# Patient Record
Sex: MALE | Race: OTHER | URBAN - METROPOLITAN AREA
[De-identification: names, ages, dates, MRNs, and addresses within clinical notes are randomized per-mention and may not be internally consistent; named-entity substitution may affect disease eponyms.]

---

## 2020-12-14 ENCOUNTER — APPOINTMENT (OUTPATIENT)
Dept: GENERAL RADIOLOGY | Age: 25
End: 2020-12-14

## 2020-12-14 ENCOUNTER — HOSPITAL ENCOUNTER (EMERGENCY)
Age: 25
Discharge: HOME | End: 2020-12-14
Attending: EMERGENCY MEDICINE

## 2020-12-14 VITALS
HEART RATE: 80 BPM | HEIGHT: 68 IN | TEMPERATURE: 98 F | SYSTOLIC BLOOD PRESSURE: 118 MMHG | RESPIRATION RATE: 16 BRPM | DIASTOLIC BLOOD PRESSURE: 74 MMHG | WEIGHT: 140 LBS | BODY MASS INDEX: 21.22 KG/M2 | OXYGEN SATURATION: 99 %

## 2020-12-14 LAB
ACETAMINOPHEN LEVEL: <5 UG/ML (ref 15–30)
ALBUMIN SERPL-MCNC: 4.6 GM/DL (ref 3.4–5)
ALCOHOL SCREEN SERUM: 0.08 %WT/VOL
ALCOHOL SCREEN SERUM: 0.2 %WT/VOL
ALP BLD-CCNC: 91 IU/L (ref 40–129)
ALT SERPL-CCNC: 21 U/L (ref 10–40)
AMPHETAMINES: NEGATIVE
ANION GAP SERPL CALCULATED.3IONS-SCNC: 13 MMOL/L (ref 4–16)
AST SERPL-CCNC: 20 IU/L (ref 15–37)
BARBITURATE SCREEN URINE: NEGATIVE
BASOPHILS ABSOLUTE: 0.1 K/CU MM
BASOPHILS RELATIVE PERCENT: 0.5 % (ref 0–1)
BENZODIAZEPINE SCREEN, URINE: NEGATIVE
BILIRUB SERPL-MCNC: 0.5 MG/DL (ref 0–1)
BUN BLDV-MCNC: 8 MG/DL (ref 6–23)
CALCIUM SERPL-MCNC: 9.1 MG/DL (ref 8.3–10.6)
CANNABINOID SCREEN URINE: NEGATIVE
CHLORIDE BLD-SCNC: 104 MMOL/L (ref 99–110)
CO2: 22 MMOL/L (ref 21–32)
COCAINE METABOLITE: NEGATIVE
CREAT SERPL-MCNC: 0.7 MG/DL (ref 0.9–1.3)
DIFFERENTIAL TYPE: ABNORMAL
DOSE AMOUNT: ABNORMAL
DOSE AMOUNT: ABNORMAL
DOSE TIME: ABNORMAL
DOSE TIME: ABNORMAL
EOSINOPHILS ABSOLUTE: 0.1 K/CU MM
EOSINOPHILS RELATIVE PERCENT: 0.8 % (ref 0–3)
GFR AFRICAN AMERICAN: >60 ML/MIN/1.73M2
GFR NON-AFRICAN AMERICAN: >60 ML/MIN/1.73M2
GLUCOSE BLD-MCNC: 104 MG/DL (ref 70–99)
HCT VFR BLD CALC: 51.1 % (ref 42–52)
HEMOGLOBIN: 18.3 GM/DL (ref 13.5–18)
IMMATURE NEUTROPHIL %: 0.3 % (ref 0–0.43)
LIPASE: 27 IU/L (ref 13–60)
LYMPHOCYTES ABSOLUTE: 1.4 K/CU MM
LYMPHOCYTES RELATIVE PERCENT: 14.7 % (ref 24–44)
MCH RBC QN AUTO: 32.4 PG (ref 27–31)
MCHC RBC AUTO-ENTMCNC: 35.8 % (ref 32–36)
MCV RBC AUTO: 90.4 FL (ref 78–100)
MONOCYTES ABSOLUTE: 0.4 K/CU MM
MONOCYTES RELATIVE PERCENT: 4.3 % (ref 0–4)
NUCLEATED RBC %: 0 %
OPIATES, URINE: NEGATIVE
OXYCODONE: NEGATIVE
PDW BLD-RTO: 11.4 % (ref 11.7–14.9)
PHENCYCLIDINE, URINE: NEGATIVE
PLATELET # BLD: 287 K/CU MM (ref 140–440)
PMV BLD AUTO: 9.5 FL (ref 7.5–11.1)
POTASSIUM SERPL-SCNC: 3.6 MMOL/L (ref 3.5–5.1)
RBC # BLD: 5.65 M/CU MM (ref 4.6–6.2)
SALICYLATE LEVEL: <0.3 MG/DL (ref 15–30)
SEGMENTED NEUTROPHILS ABSOLUTE COUNT: 7.4 K/CU MM
SEGMENTED NEUTROPHILS RELATIVE PERCENT: 79.4 % (ref 36–66)
SODIUM BLD-SCNC: 139 MMOL/L (ref 135–145)
TOTAL IMMATURE NEUTOROPHIL: 0.03 K/CU MM
TOTAL NUCLEATED RBC: 0 K/CU MM
TOTAL PROTEIN: 7.5 GM/DL (ref 6.4–8.2)
TSH HIGH SENSITIVITY: 1.8 UIU/ML (ref 0.27–4.2)
WBC # BLD: 9.3 K/CU MM (ref 4–10.5)

## 2020-12-14 PROCEDURE — 71045 X-RAY EXAM CHEST 1 VIEW: CPT

## 2020-12-14 PROCEDURE — 80307 DRUG TEST PRSMV CHEM ANLYZR: CPT

## 2020-12-14 PROCEDURE — G0480 DRUG TEST DEF 1-7 CLASSES: HCPCS

## 2020-12-14 PROCEDURE — 85025 COMPLETE CBC W/AUTO DIFF WBC: CPT

## 2020-12-14 PROCEDURE — 83690 ASSAY OF LIPASE: CPT

## 2020-12-14 PROCEDURE — 99285 EMERGENCY DEPT VISIT HI MDM: CPT

## 2020-12-14 PROCEDURE — 84443 ASSAY THYROID STIM HORMONE: CPT

## 2020-12-14 PROCEDURE — 93010 ELECTROCARDIOGRAM REPORT: CPT | Performed by: INTERNAL MEDICINE

## 2020-12-14 PROCEDURE — 93005 ELECTROCARDIOGRAM TRACING: CPT | Performed by: PHYSICIAN ASSISTANT

## 2020-12-14 PROCEDURE — 94761 N-INVAS EAR/PLS OXIMETRY MLT: CPT

## 2020-12-14 PROCEDURE — 80053 COMPREHEN METABOLIC PANEL: CPT

## 2020-12-14 PROCEDURE — 36415 COLL VENOUS BLD VENIPUNCTURE: CPT

## 2020-12-14 NOTE — ED NOTES
Pt resting at this time eyes closed. No acute distress noted. Green gown remains in place, 1:1 continuous monitoring maintained. Sitter at the bedside.  Will continue to monitor     Morgan Salazar RN  12/14/20 3323

## 2020-12-14 NOTE — ED NOTES
Patient was noted to be falling asleep while sitting up and started to slump forward.  The patient was advised that he could lay down and try to get some rest. Patient agreed      Violette Cowart  12/14/20 0800

## 2020-12-14 NOTE — ED NOTES
Pt resting in a position of comfort. No needs identified at this time. Bed in lowest position and sitter at bedside 1:1. Respirations even, no distress noted. Suicide precautions maintained.       Cinthya Valadez RN  12/14/20 9756

## 2020-12-14 NOTE — ED PROVIDER NOTES
EMERGENCY DEPARTMENT ENCOUNTER      PCP: No primary care provider on file. CHIEF COMPLAINT    Chief Complaint   Patient presents with    Suicidal       Of note, this patient was also evaluated by the attending physician, Dr. Rickey Buckley. HPI    Shahrzad Davenport is a 23 y.o. male who presents via EMS with no ideation. Patient has been drinking alcohol tonight and then received a call saying that his 3year-old son in [place]  today. Patient was at home when he received this call where he lives with his brother and his brother's family. Patient's brother provides additional history and reports that when patient found out that his son had  patient reported \"If my son is dead then I do not want to live anymore. \"  Patient then got into a vehicle and tried to drive away but family was able to get the keys from him at staff him. Patient then began to hyperventilate while sitting down and felt very short of breath and patient's brother was holding onto him. He briefly lost consciousness for less than 1 minute but did not follow for her strike his head. He was alert and oriented upon waking. He had no shaking behavior during loss of consciousness. EMS was called at this time. Patient's brother is concerned for patient safety and is afraid if he goes home he will harm himself. Patient's brother reports that 2 years ago patient did attempt suicide when he lived in [place]. Patient reports history of depression 2 years ago but patient denies history of suicide attempts. Patient denies any current chest pain or shortness of breath. Patient and patient's brother are both Faroese-speaking and therefore  was utilized. Patient's brother's phone number is [phone]. Patient denies homicidal ideations. Patient denies auditory and visual hallucinations. Patient denies current shortness of breath. Patient denies chest pain. REVIEW OF SYSTEMS    Psychiatric: See HPI. General: No fevers or chills  Cardiac:  No chest pain or palpitations  Respiratory: + SOB; No new cough  Neurologic: + loss of consciousness; No Headache, numbness, weakness, tingling  GI: No vomiting or diarrhea  : No dysuria or hematuria  See HPI for further details. All other systems reviewed and are negative. PAST MEDICAL & SURGICALHISTORY    History reviewed. No pertinent past medical history. History reviewed. No pertinent surgical history. CURRENT MEDICATIONS        ALLERGIES    No Known Allergies    SOCIAL & FAMILYHISTORY    Social History     Socioeconomic History    Marital status: Single     Spouse name: None    Number of children: None    Years of education: None    Highest education level: None   Occupational History    None   Social Needs    Financial resource strain: None    Food insecurity     Worry: None     Inability: None    Transportation needs     Medical: None     Non-medical: None   Tobacco Use    Smoking status: None   Substance and Sexual Activity    Alcohol use: None    Drug use: None    Sexual activity: None   Lifestyle    Physical activity     Days per week: None     Minutes per session: None    Stress: None   Relationships    Social connections     Talks on phone: None     Gets together: None     Attends Yarsanism service: None     Active member of club or organization: None     Attends meetings of clubs or organizations: None     Relationship status: None    Intimate partner violence     Fear of current or ex partner: None     Emotionally abused: None     Physically abused: None     Forced sexual activity: None   Other Topics Concern    None   Social History Narrative    None     History reviewed. No pertinent family history.     PHYSICAL EXAM    VITAL SIGNS: /74   Pulse 80   Temp 98 °F (36.7 °C) (Oral)   Resp 16   Ht 5' 8\" (1.727 m)   Wt 140 lb (63.5 kg)   SpO2 99%   BMI 21.29 kg/m² MCV 90.4 78 - 100 FL    MCH 32.4 (H) 27 - 31 PG    MCHC 35.8 32.0 - 36.0 %    RDW 11.4 (L) 11.7 - 14.9 %    Platelets 927 970 - 708 K/CU MM    MPV 9.5 7.5 - 11.1 FL    Differential Type AUTOMATED DIFFERENTIAL     Segs Relative 79.4 (H) 36 - 66 %    Lymphocytes % 14.7 (L) 24 - 44 %    Monocytes % 4.3 (H) 0 - 4 %    Eosinophils % 0.8 0 - 3 %    Basophils % 0.5 0 - 1 %    Segs Absolute 7.4 K/CU MM    Lymphocytes Absolute 1.4 K/CU MM    Monocytes Absolute 0.4 K/CU MM    Eosinophils Absolute 0.1 K/CU MM    Basophils Absolute 0.1 K/CU MM    Nucleated RBC % 0.0 %    Total Nucleated RBC 0.0 K/CU MM    Total Immature Neutrophil 0.03 K/CU MM    Immature Neutrophil % 0.3 0 - 0.43 %   Comprehensive Metabolic Panel w/ Reflex to MG   Result Value Ref Range    Sodium 139 135 - 145 MMOL/L    Potassium 3.6 3.5 - 5.1 MMOL/L    Chloride 104 99 - 110 mMol/L    CO2 22 21 - 32 MMOL/L    BUN 8 6 - 23 MG/DL    CREATININE 0.7 (L) 0.9 - 1.3 MG/DL    Glucose 104 (H) 70 - 99 MG/DL    Calcium 9.1 8.3 - 10.6 MG/DL    Alb 4.6 3.4 - 5.0 GM/DL    Total Protein 7.5 6.4 - 8.2 GM/DL    Total Bilirubin 0.5 0.0 - 1.0 MG/DL    ALT 21 10 - 40 U/L    AST 20 15 - 37 IU/L    Alkaline Phosphatase 91 40 - 129 IU/L    GFR Non-African American >60 >60 mL/min/1.73m2    GFR African American >60 >60 mL/min/1.73m2    Anion Gap 13 4 - 16   Lipase   Result Value Ref Range    Lipase 27 13 - 60 IU/L   EKG 12 Lead   Result Value Ref Range    Ventricular Rate 79 BPM    Atrial Rate 79 BPM    P-R Interval 142 ms    QRS Duration 84 ms    Q-T Interval 334 ms    QTc Calculation (Bazett) 382 ms    P Axis 77 degrees    R Axis 88 degrees    T Axis 68 degrees    Diagnosis       Normal sinus rhythm  Early repolarization  Normal ECG  No previous ECGs available         EKG Interpretation  Please see ED physician's note for EKG interpretation - Dr. Christy Sweeney:  XR CHEST PORTABLE   Final Result   No acute process.                ED COURSE & MEDICAL DECISION MAKING Vital signs and nursing notes reviewed during ED course. Patient care and presentation staffed and seen in conjunction with supervising physician, Dr. Kavon Rosario. Patient presents as above which prompted work up today. After obtaining collateral history from patient's brother with patient's permission the brother reported to me that patient has attempted suicide before and he is concerned for the patient's safety. He does not feel patient will be safe for discharge. Given patient's history and collateral history obtained, current suicidal ideation, and recent stressor- I am concerned for patient's safety. Patient pink slipped for safety at this time. Patient changed into green gown and belongings secured by security. Sitter at bedside 1:1 and suicide precautions in place. While in the ED today- labs, EKG, and imaging were obtained. For EKG interpretation- see ED physician's note. Initially elevated ETOH of 0.20 but rest of labs without clinically significant derangement. Chest xray obtained today and reveals no acute cardiopulmonary process. No pneumothorax, no consolidation concerning for pneumonia, no pleural effusion. Patient is neurologically intact on exam. Suspect brief loss of consciousness was secondary to patient hyperventilating. He has been hemodynamically stable in the ED. Patient will require repeat ETOH level and mental health evaluation. All pertinent Lab data and radiographic results reviewed with patient at bedside. The patient and/or the family were informed of the results of any tests/labs/imaging, the treatment plan, and time was allotted to answer questions. My shift has come to an end at 0600. Patient care signed out to supervising physician. In this case, see her note for further details, remaining Emergency Department course, final disposition and clinical impressions.     ________________________________________________________________________ Clinical  IMPRESSION    1. Suicidal ideation    2. Acute alcoholic intoxication without complication ([address]    3. Shortness of breath    4. Brief loss of consciousness              Comment: Please note this report has been produced using speech recognition software and may contain errors related to that system including errors in grammar, punctuation, and spelling, as well as words and phrases that may be inappropriate. If there are any questions or concerns please feel free to contact the dictating provider for clarification.         Barby Savage PA-C  12/14/20 1600

## 2020-12-14 NOTE — ED NOTES
Pt resting in a position of comfort. No needs identified at this time. Bed in lowest position and sitter at bedside 1:1. Respirations even, no distress noted. Suicide precautions maintained.      Abida Cheung RN  12/14/20 0558

## 2020-12-14 NOTE — ED NOTES
Patient remains on suicide precautions. 1:1  remains in the room at this time  . Room has been checked and safety measures remain in place. Pt resting eyes closed at this time.      Terra Serrano RN  12/14/20 1635

## 2020-12-14 NOTE — ED NOTES
Chart faxed to Marc Harvey at [facility] at this time      Marisel Corral RN  12/14/20 8558

## 2020-12-14 NOTE — ED NOTES
Attempted to contact [facility] to see what the POC is for pt after their assessment, Yessenia perez RN did not know at this time     Mg Killian RN  12/14/20 7920
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
## 2020-12-14 NOTE — ED NOTES
Wilmar GRAY in triage room speaking with family     Samiroger Malloryjosefa, [address] [facility]  12/14/20 0073

## 2020-12-14 NOTE — ED NOTES
Pt given phone by nurse for [address] assessment and to [facility] using interpretor through phone also     Mack Atkins, RN  12/14/20 3470

## 2020-12-14 NOTE — ED NOTES
Patients brother Kimberli Dykes [phone]. DOES NOT SPEAK ENGLISH.  WILL NEED       Cate Juarez  12/14/20 6844

## 2020-12-14 NOTE — ED NOTES
Spoke with Lisa Pike from [facility]. . states she is ready to review chart of patient and needs chart faxed.  Fax # [phone]     Terell Rizzo RN  12/14/20 5811

## 2020-12-14 NOTE — ED NOTES
Pt attempted to leave. Rita GRAY notified. Security at bedside.      Sherrill Kearns RN  12/14/20 2483

## 2020-12-14 NOTE — ED PROVIDER NOTES
Cocaine Metabolite NEGATIVE NEGATIVE    Benzodiazepine Screen, Urine NEGATIVE NEGATIVE    Barbiturate Screen, Ur NEGATIVE NEGATIVE    Opiates, Urine NEGATIVE NEGATIVE    Phencyclidine, Urine NEGATIVE NEGATIVE    Oxycodone NEGATIVE NEGATIVE   CBC Auto Differential   Result Value Ref Range    WBC 9.3 4.0 - 10.5 K/CU MM    RBC 5.65 4.6 - 6.2 M/CU MM    Hemoglobin 18.3 (H) 13.5 - 18.0 GM/DL    Hematocrit 51.1 42 - 52 %    MCV 90.4 78 - 100 FL    MCH 32.4 (H) 27 - 31 PG    MCHC 35.8 32.0 - 36.0 %    RDW 11.4 (L) 11.7 - 14.9 %    Platelets 041 813 - 648 K/CU MM    MPV 9.5 7.5 - 11.1 FL    Differential Type AUTOMATED DIFFERENTIAL     Segs Relative 79.4 (H) 36 - 66 %    Lymphocytes % 14.7 (L) 24 - 44 %    Monocytes % 4.3 (H) 0 - 4 %    Eosinophils % 0.8 0 - 3 %    Basophils % 0.5 0 - 1 %    Segs Absolute 7.4 K/CU MM    Lymphocytes Absolute 1.4 K/CU MM    Monocytes Absolute 0.4 K/CU MM    Eosinophils Absolute 0.1 K/CU MM    Basophils Absolute 0.1 K/CU MM    Nucleated RBC % 0.0 %    Total Nucleated RBC 0.0 K/CU MM    Total Immature Neutrophil 0.03 K/CU MM    Immature Neutrophil % 0.3 0 - 0.43 %   Comprehensive Metabolic Panel w/ Reflex to MG   Result Value Ref Range    Sodium 139 135 - 145 MMOL/L    Potassium 3.6 3.5 - 5.1 MMOL/L    Chloride 104 99 - 110 mMol/L    CO2 22 21 - 32 MMOL/L    BUN 8 6 - 23 MG/DL    CREATININE 0.7 (L) 0.9 - 1.3 MG/DL    Glucose 104 (H) 70 - 99 MG/DL    Calcium 9.1 8.3 - 10.6 MG/DL    Alb 4.6 3.4 - 5.0 GM/DL    Total Protein 7.5 6.4 - 8.2 GM/DL    Total Bilirubin 0.5 0.0 - 1.0 MG/DL    ALT 21 10 - 40 U/L    AST 20 15 - 37 IU/L    Alkaline Phosphatase 91 40 - 129 IU/L    GFR Non-African American >60 >60 mL/min/1.73m2    GFR African American >60 >60 mL/min/1.73m2    Anion Gap 13 4 - 16   Lipase   Result Value Ref Range    Lipase 27 13 - 60 IU/L   Ethanol   Result Value Ref Range    Alcohol Scrn 0.08 (H) <0.01 %WT/VOL   EKG 12 Lead   Result Value Ref Range    Ventricular Rate 79 BPM Atrial Rate 79 BPM    P-R Interval 142 ms    QRS Duration 84 ms    Q-T Interval 334 ms    QTc Calculation (Bazett) 382 ms    P Axis 77 degrees    R Axis 88 degrees    T Axis 68 degrees    Diagnosis       Normal sinus rhythm  Early repolarization  Normal ECG  No previous ECGs available  Confirmed by Shraddha Otto MD, Jodi Pearson ([id number]) on 12/14/2020 1:36:53 PM          Radiographs:  [] Radiologist's Report Reviewed:  XR CHEST PORTABLE   Final Result   No acute process. MDM:  Patient was signed out to me by Dr. Debbie Zaragoza on 12/14/2020 at 1500    Patient was seen and evaluated in the emergency department by myself. A thorough history and physical exam were performed, prior medical records reviewed. Prior documentation was reviewed as well. Patient's vital signs are noted and are stable. Differential diagnoses and treatment plan are discussed. Labs reviewed demonstrating no leukocytosis, anemia, thrombocytopenia. Electrolytes within normal limits. No signs of kidney injury glucose within normal limits. AST ALT are unremarkable. Lipase is 27. Acetaminophen is less than 5. Salicylate less than 0.3. Alcohol is initially 0.2 and trends down to 0.08. Chest x-ray radiology report reads no acute process. Patient is medically cleared. Suicide and elopement precautions are in place. Mental health crisis team is consulted. Rosette Iqbal evaluated the patient, patient adamantly denies any suicidal ideation. Devin Mccarthy also discussed case with patient's family. They believe that patient said these things in a fit of rage. On my reevaluation, patient adamantly denies suicidal ideation. States that he is not really suicidal and would not take his life. Sister-in-law does present to the bedside. Patient lives with brother and sister-in-law. Sister-in-law states that she is currently unemployed. She feels safe taking the patient home. She does not feel that he is truly suicidal.  States that he is grief stricken due to recent death of his son. She contracts for safety. Patient instructed to follow-up with primary care provider for reevaluation. Instructed to follow-up with psychiatry as well. Instructed to return to the emergency department immediately with any new, worsening, concerning symptoms. Additional verbal and printed discharge instructions were provided. Patient verbalizes understanding and is agreeable with discharge plan. Patient discharged in stable condition. Clinical Impressions:  1. Suicidal ideation    2. Acute alcoholic intoxication without complication [facility])        Disposition referral:  [facility] Emergency Department  [place] [id number] [phone]  Go to   Immediately with any new, worsening, concerning symptoms. [facility]  [address] [facility]  [phone]  In 3 days  Please follow-up with primary care physician for reevaluation and to establish care.     Allison Boone MD  [address] [place] [id number]  [phone]    In 2 days  Please follow-up with psychiatrist for reevaluation      Disposition:  Discharge Comment: Please note this report has been produced using speech recognition software and may contain errors related to that system including errors in grammar, punctuation, and spelling, as well as words and phrases that may be inappropriate. Efforts were made to edit the dictations.        [address] [facility],   12/14/20 2014

## 2020-12-14 NOTE — ED NOTES
Pt resting at this time eyes closed. No acute distress noted. Green gown remains in place, 1:1 continuous monitoring maintained. Sitter at the bedside.  Will continue to monitor     Alvenia JESSIE Prado  12/14/20 5507

## 2020-12-14 NOTE — ED TRIAGE NOTES
Pt presents to the ED via EMS for SI. Pt received news from [place] stating that his child passed away from [facility]. States in the moment he made some comments about wanting to kill himself, states he does not remember exactly what he said. Does not have a specific plan. States two years ago he came here for a heart issue, besides that he has no other medical history. His family made him come here today because he has been drinking and states that he needs to have some medication to help with his anxiety. Pt is alert and oriented. Pt is only Mohawk speaking. Interpretor [id number] used. No other questions at this time.

## 2020-12-14 NOTE — ED NOTES
Patient remains on suicide precautions. 1:1  remains in the room at this time  . Room has been checked and safety measures remain in place. Pt resting eyes closed at this time.      Mikey Dunlap RN  12/14/20 8666

## 2020-12-14 NOTE — ED NOTES
Pt resting in a position of comfort. No needs identified at this time. Bed in lowest position and sitter at bedside 1:1. Respirations even, no distress noted. Suicide precautions maintained.       Rimma Lowery RN  12/14/20 5844

## 2020-12-15 NOTE — ED NOTES
Pt resting at this time eyes closed. No acute distress noted. Green gown remains in place, 1:1 continuous monitoring maintained. Sitter at the bedside.  Will continue to monitor     Pardeep Thomas RN  12/14/20 1910

## 2020-12-15 NOTE — ED NOTES
Pt resting quietly in room, resps easy and even, no s/sx of distress at this time, pt denies needs at this time.  Sitter remains at the bedside     Darell Jay RN  12/14/20 3705

## 2020-12-15 NOTE — ED NOTES
Patient discharge and follow up reviewed with patient, verbalizes understanding and denies questions. Patient appears in no acute distress; A+Ox4, respirations equal and unlabored, denies pain, skin warm and dry. Patient with all belongings and ambulated with family member from the ED to the waiting area with a steady gait without incident.       Jazz Poole RN  12/14/20 2023

## 2020-12-15 NOTE — ED NOTES
No acute distress noted. Green gown remains in place, 1:1 continuous monitoring maintained. Sitter at the bedside.  Will continue to monitor     Morgan Salazar RN  12/14/20 1187

## 2020-12-15 NOTE — ED NOTES
Report received from [facility], Care assumed at this time. Pt is resting in bed, sitter at bedside. Room is checked for safety precautions.       Faraz Vega RN  12/14/20 1934

## 2020-12-15 NOTE — SUICIDE SAFETY PLAN
SAFETY PLAN    Pt is to follow up with Dr Eren Felipe around 12/16/2020.      Suicide Prevention Lifeline: 5-321-323-HWJC (2765)

## 2020-12-15 NOTE — ED NOTES
Patient remains on suicide precautions. 1:1  remains in the room at this time  . Room has been checked and safety measures remain in place. Pt resting eyes closed at this time.      Della Ambrocio RN  12/14/20 0656

## 2020-12-15 NOTE — ED NOTES
Patient talked with mental health via the phone and [facility] crisis worker used their  via the phone      Katrina Diallo RN  12/14/20 Georges Eddy RN  12/14/20 2036

## 2020-12-16 LAB
EKG ATRIAL RATE: 79 BPM
EKG DIAGNOSIS: NORMAL
EKG P AXIS: 77 DEGREES
EKG P-R INTERVAL: 142 MS
EKG Q-T INTERVAL: 334 MS
EKG QRS DURATION: 84 MS
EKG QTC CALCULATION (BAZETT): 382 MS
EKG R AXIS: 88 DEGREES
EKG T AXIS: 68 DEGREES
EKG VENTRICULAR RATE: 79 BPM